# Patient Record
Sex: FEMALE | Race: WHITE | NOT HISPANIC OR LATINO | Employment: OTHER | URBAN - METROPOLITAN AREA
[De-identification: names, ages, dates, MRNs, and addresses within clinical notes are randomized per-mention and may not be internally consistent; named-entity substitution may affect disease eponyms.]

---

## 2023-02-07 ENCOUNTER — OFFICE VISIT (OUTPATIENT)
Dept: ORTHOPEDIC SURGERY | Facility: CLINIC | Age: 63
End: 2023-02-07
Payer: COMMERCIAL

## 2023-02-07 VITALS
SYSTOLIC BLOOD PRESSURE: 155 MMHG | DIASTOLIC BLOOD PRESSURE: 66 MMHG | HEIGHT: 64 IN | WEIGHT: 196 LBS | BODY MASS INDEX: 33.46 KG/M2

## 2023-02-07 DIAGNOSIS — S42.475A CLOSED NONDISPLACED TRANSCONDYLAR FRACTURE OF LEFT HUMERUS, INITIAL ENCOUNTER: ICD-10-CM

## 2023-02-07 DIAGNOSIS — Z72.0 TOBACCO ABUSE: ICD-10-CM

## 2023-02-07 DIAGNOSIS — M25.522 LEFT ELBOW PAIN: Primary | ICD-10-CM

## 2023-02-07 DIAGNOSIS — M19.029 ELBOW ARTHRITIS: ICD-10-CM

## 2023-02-07 PROCEDURE — 99204 OFFICE O/P NEW MOD 45 MIN: CPT | Performed by: PHYSICIAN ASSISTANT

## 2023-02-07 RX ORDER — HYDROCODONE BITARTRATE AND ACETAMINOPHEN 5; 325 MG/1; MG/1
1 TABLET ORAL EVERY 6 HOURS
COMMUNITY
Start: 2023-01-23

## 2023-02-07 RX ORDER — FLUOXETINE 10 MG/1
1 CAPSULE ORAL DAILY
COMMUNITY
Start: 2022-11-29

## 2023-02-07 RX ORDER — PRAVASTATIN SODIUM 20 MG
1 TABLET ORAL DAILY
COMMUNITY
Start: 2022-11-29

## 2023-02-07 RX ORDER — ASPIRIN 81 MG/1
1 TABLET, DELAYED RELEASE ORAL DAILY
COMMUNITY
Start: 2022-11-29

## 2023-02-07 RX ORDER — OMEPRAZOLE 20 MG/1
1 CAPSULE, DELAYED RELEASE ORAL DAILY
COMMUNITY
Start: 2022-11-29

## 2023-02-07 RX ORDER — HYDROXYZINE HYDROCHLORIDE 25 MG/1
25 TABLET, FILM COATED ORAL 3 TIMES DAILY PRN
COMMUNITY

## 2023-02-07 NOTE — PROGRESS NOTES
[facility] [place]    Subjective     Chief Complaint   Patient presents with   • Left Elbow - Pain        HPI  Gladis Bui is a 62 y.o. female.  Right-hand-dominant.  New patient presents for evaluation of left elbow pain.  Patient reports a history of left distal humerus fracture in 1985 that was initially treated in a cast and then underwent ORIF and bone grafting (Dr. Galan, 1/19/1985).  Then reports 6 months later her hardware was removed from the elbow.  Then right after Jazmin around the first of the new year she reports a history of a fall.  Due to persistent pain she was seen by her PCP who ordered imaging which was completed at [facility] (1/14/2023)--Per report nondisplaced supracondylar fracture of the distal humerus.  Patient was placed in a posterior long-arm splint.  She reports that she has removed it periodically to clean her arm and then place the splint back on the arm.      Pain scale: 8/10.  Severity of the pain severe.  Quality of the pain aching, burning, shooting.  Associated symptoms swelling.  Activity related to pain movement of joint.  Pain eased by resting, ice lying down.  No reported numbness or tingling.  Prior treatments splint to elbow.    Denies fever, chills, night sweats or other constitutional symptoms.  Positive smoker.      Past Medical History:   Diagnosis Date   • Depression    • Heartburn    • Hyperlipidemia    • Hypertension    • Presence of arterial stent     left leg      Past Surgical History:   Procedure Laterality Date   • ELBOW PROCEDURE Left     1985   • WRIST SURGERY Left       History reviewed. No pertinent family history.  Social History     Socioeconomic History   • Marital status:    Tobacco Use   • Smoking status: Every Day     Types: Cigarettes   Substance and Sexual Activity   • Alcohol use: Not Currently   • Sexual activity: Defer      Current Outpatient Medications on File Prior to Visit   Medication Sig  "Dispense Refill   • FLUoxetine (PROzac) 10 MG capsule Take 1 capsule by mouth Daily.     • HYDROcodone-acetaminophen (NORCO) 5-325 MG per tablet Take 1 tablet by mouth Every 6 (Six) Hours.     • hydrOXYzine (ATARAX) 25 MG tablet Take 25 mg by mouth 3 (Three) Times a Day As Needed for Itching.     • IPRATROPIUM BROMIDE IN Inhale.     • omeprazole (priLOSEC) 20 MG capsule Take 1 capsule by mouth Daily.     • pravastatin (PRAVACHOL) 20 MG tablet Take 1 tablet by mouth Daily.     • SM Aspirin Low Dose 81 MG EC tablet Take 1 tablet by mouth Daily.       No current facility-administered medications on file prior to visit.      No Known Allergies     The following portions of the patient's history were reviewed and updated as appropriate: allergies, current medications, past family history, past medical history, past social history, past surgical history and problem list.    Review of Systems   Constitutional: Negative.    HENT: Negative.    Eyes: Negative.    Respiratory: Negative.    Cardiovascular: Negative.    Gastrointestinal: Negative.    Endocrine: Negative.    Genitourinary: Negative.    Musculoskeletal: Positive for arthralgias.   Skin: Negative.    Allergic/Immunologic: Negative.    Neurological: Negative.    Hematological: Negative.    Psychiatric/Behavioral: Negative.         Objective      Physical Exam  /66   Ht 163 cm (64.17\")   Wt 88.9 kg (196 lb)   BMI 33.46 kg/m²     Body mass index is 33.46 kg/m².    GENERAL APPEARANCE: awake, alert & oriented x 3, in no acute distress and well developed, well nourished  PSYCH: normal mood and affect  LUNGS:  breathing nonlabored, no wheezing  EYES: sclera anicteric, pupils equal  CARDIOVASCULAR: palpable pulses. Capillary refill less than 2 seconds  INTEGUMENTARY: skin intact, no clubbing, cyanosis  NEUROLOGIC:  Normal Sensation         Ortho Exam  Left elbow  Skin: Intact without any erythema, warmth, swelling.  Tenderness: Diffuse/global tenderness noted " throughout elbow  Motion: Extension 35°.  Flexion 95°.  Supination 75°.  Pronation 85°.  Motor: Grossly intact R/U/M/AIN/PIN.  Sensory: Grossly intact to light touch R/U/M.  Vascular: 2+ radial pulse with brisk capillary refill into each digit.      Imaging/Studies  Ordered left elbow plain films.  Imaging read/interpreted by Dr. Hackett.  Imaging Results (Last 7 Days)     Procedure Component Value Units Date/Time    XR Elbow 3+ View Left [[id number]] Resulted: 02/07/23 1647     Updated: 02/07/23 1648    Narrative:      Imaging: elbow x-rays 3 views - AP, lateral, and oblique elbow x-ray views    Side: LEFT ELBOW    Indication for elbow x-ray 3 views: elbow pain    Comparison: no comparison views available    Findings:   Evidence of significant baseline elbow joint arthritis, joint space   absence, prior healed fracture chronically but also evidence of transverse   intercondylar subacute fracture versus fracture nonunion.    I personally reviewed the above x-rays discussed with Tosin BELL          Also reviewed CT scan brought in by the patient from [facility] from 1/14/2023--nondisplaced supracondylar fracture distal humerus with moderate degenerative changes noted about elbow.  Assessment/Plan        ICD-10-CM ICD-9-CM   1. Left elbow pain  M25.522 719.42   2. Closed nondisplaced transcondylar fracture of left humerus, initial encounter  S42.475A 812.44   3. Elbow arthritis  M19.029 716.92   4. Tobacco abuse  Z72.0 305.1       Orders Placed This Encounter   Procedures   • XR Elbow 3+ View Left        -Left elbow pain due to prior healed fracture with evidence of transverse intercondylar subacute fracture versus fracture nonunion in setting of elbow arthritis.   -Reviewed imaging with the patient.  -Patient was placed into an X ROM elbow brace locked at 90 degrees.  May may remove periodically for gentle range of motion elbow.  -To remain nonweightbearing to the left elbow.  -After  discussion with Dr. Hackett, recommend referral to  (Dr. Harris) for further evaluation and treatment of nonunion and consideration of possible elbow replacement based on severity of arthritis.  -Pain management through PCP.  Patient is currently on hydrocodone.  Recommend transition to over-the-counter when/as able.  -Copy of today's imaging was burned onto a disc so that she may take this to her appointment at .  Patient was also given her CT scan disc back.  -Tobacco abuse--discussed the importance of smoking cessation, reviewed the adverse effects of tobacco use: increased risk of tendonitis (more difficult to treat and resolve), hardening of the arteries, gangrene, amputation, etc. Included in the counseling were treatments options for cessation: quitting cold turkey, medication, nicotine step-down programs and smoking cessation programs. Furthermore, I explained to the patient the importance of abstaining from nicotine usage as nicotine is known to increase risk of complications associated with surgery including but not limited to infection, decreased wound healing, fracture/fusion nonunion, slowed soft tissue healing, and increased surgery associated pain. (5 minutes spent counseling patient)  -Follow up our clinic as needed.  -Questions and concerns answered.    History, exam and imaging discussed with Dr. Hackett, who agrees with the above assessment and plan.      Medical Decision Making  Management Options : over-the-counter medicine, prescription/IM medicine and close treatment of fracture or dislocation  Data/Risk: radiology tests       Tosin Weber PA-C  02/07/23  22:27 EST               EMR Dragon/Transcription disclaimer:  Much of this encounter note is an electronic transcription of spoken language to printed text. Electronic transcription of spoken language may permit erroneous, or at times, nonsensical words or phrases to be inadvertently transcribed. Although I have reviewed the  note for such errors, some may still exist.